# Patient Record
Sex: MALE | URBAN - METROPOLITAN AREA
[De-identification: names, ages, dates, MRNs, and addresses within clinical notes are randomized per-mention and may not be internally consistent; named-entity substitution may affect disease eponyms.]

---

## 2023-03-27 ENCOUNTER — TELEPHONE (OUTPATIENT)
Dept: PSYCHIATRY | Facility: CLINIC | Age: 43
End: 2023-03-27

## 2023-03-27 NOTE — TELEPHONE ENCOUNTER
Patient called to inquire about [facility] therapy services   Writer advised there is a waitlist for services and added patient to the list  Also emailed patient a additional resources list

## 2023-04-05 NOTE — TELEPHONE ENCOUNTER
LVm for patient to callback in regards to scheduling [facility] therapy  There is limited availability to schedule therapy

## 2023-04-05 NOTE — TELEPHONE ENCOUNTER
Patient called back to inquire about message left  Writer advised patient that there is some availability to schedule therapy services  Patient stated that he only needed medication management

## 2023-12-28 ENCOUNTER — TELEPHONE (OUTPATIENT)
Dept: PSYCHIATRY | Facility: CLINIC | Age: 43
End: 2023-12-28

## 2024-04-09 ENCOUNTER — TELEPHONE (OUTPATIENT)
Dept: PSYCHIATRY | Facility: CLINIC | Age: 44
End: 2024-04-09